# Patient Record
Sex: FEMALE | Race: WHITE | NOT HISPANIC OR LATINO | Employment: FULL TIME | URBAN - METROPOLITAN AREA
[De-identification: names, ages, dates, MRNs, and addresses within clinical notes are randomized per-mention and may not be internally consistent; named-entity substitution may affect disease eponyms.]

---

## 2023-07-18 ENCOUNTER — OFFICE VISIT (OUTPATIENT)
Dept: ORTHOPEDIC SURGERY | Facility: CLINIC | Age: 53
End: 2023-07-18
Payer: COMMERCIAL

## 2023-07-18 VITALS
SYSTOLIC BLOOD PRESSURE: 118 MMHG | HEIGHT: 65 IN | DIASTOLIC BLOOD PRESSURE: 60 MMHG | WEIGHT: 148.2 LBS | BODY MASS INDEX: 24.69 KG/M2

## 2023-07-18 DIAGNOSIS — M25.522 LEFT ELBOW PAIN: Primary | ICD-10-CM

## 2023-07-18 DIAGNOSIS — M77.12 LATERAL EPICONDYLITIS OF LEFT ELBOW: ICD-10-CM

## 2023-07-18 PROCEDURE — 99203 OFFICE O/P NEW LOW 30 MIN: CPT

## 2023-07-18 RX ORDER — MONTELUKAST SODIUM 10 MG/1
10 TABLET ORAL DAILY
COMMUNITY
Start: 2023-05-03

## 2023-07-18 RX ORDER — HYDROXYZINE 50 MG/1
TABLET, FILM COATED ORAL
COMMUNITY

## 2023-07-18 RX ORDER — FAMOTIDINE 40 MG/1
40 TABLET, FILM COATED ORAL DAILY
COMMUNITY
Start: 2023-05-03

## 2023-07-18 RX ORDER — GALCANEZUMAB 120 MG/ML
120 INJECTION, SOLUTION SUBCUTANEOUS
COMMUNITY
Start: 2023-07-07

## 2023-07-18 RX ORDER — RIZATRIPTAN BENZOATE 10 MG/1
TABLET ORAL
COMMUNITY
Start: 2023-04-04

## 2023-07-18 RX ORDER — MELOXICAM 15 MG/1
TABLET ORAL
Qty: 30 TABLET | Refills: 1 | Status: SHIPPED | OUTPATIENT
Start: 2023-07-18

## 2023-07-18 NOTE — PROGRESS NOTES
[facility] Office Visit - Tameka Quan PA-C    Office Visit       Patient Name: Supriya Patel    Chief Complaint:   Chief Complaint   Patient presents with    Left Elbow - Pain       Referring Physician: No ref. provider found    History of Present Illness:   Supriya Patel is a 52 y.o. female who presents with left elbow pain.  Ongoing for the last 3 months.  No injury or trauma. Rates the pain 6/10.  Described as aching and throbbing.  Associated with swelling.  She says the pain has worsened recently due to helping care for her mother in law more frequently this summer.  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  No treatments tried.      Subjective     Review of Systems   Constitutional: Negative.  Negative for chills, fatigue and fever.   HENT: Negative.  Negative for congestion and dental problem.    Eyes: Negative.  Negative for blurred vision.   Respiratory: Negative.  Negative for shortness of breath.    Cardiovascular: Negative.  Negative for leg swelling.   Gastrointestinal: Negative.  Negative for abdominal pain.   Endocrine: Negative.  Negative for polyuria.   Genitourinary: Negative.  Negative for difficulty urinating.   Musculoskeletal:  Positive for arthralgias.   Skin: Negative.    Allergic/Immunologic: Negative.    Neurological: Negative.    Hematological: Negative.  Negative for adenopathy.   Psychiatric/Behavioral: Negative.  Negative for behavioral problems.       Past Medical History: History reviewed. No pertinent past medical history.    Past Surgical History:   Past Surgical History:   Procedure Laterality Date    ANKLE OPEN REDUCTION INTERNAL FIXATION  2018    HAND SURGERY  2015       Family History:   Family History   Problem Relation Age of Onset    Osteoporosis Mother        Social History:   Social History     Socioeconomic History    Marital status:    Tobacco Use    Smoking status: Never   Vaping Use  "   Vaping Use: Never used   Substance and Sexual Activity    Alcohol use: Not Currently    Drug use: Never    Sexual activity: Yes     Partners: Male       Medications:   Current Outpatient Medications:     Emgality 120 MG/ML auto-injector pen, Inject 1 mL under the skin into the appropriate area as directed., Disp: , Rfl:     famotidine (PEPCID) 40 MG tablet, Take 1 tablet by mouth Daily., Disp: , Rfl:     hydrOXYzine (ATARAX) 50 MG tablet, , Disp: , Rfl:     montelukast (SINGULAIR) 10 MG tablet, Take 1 tablet by mouth Daily., Disp: , Rfl:     rizatriptan (MAXALT) 10 MG tablet, TAKE 1 TABLET BY MOUTH FOR HEADACHE, Disp: , Rfl:     Allergies: No Known Allergies    I have reviewed and updated the following portions of the patient's history and review of systems: allergies, current medications, past family history, past medical history, past social history, past surgical history and problem list.    Objective      Vital Signs:   Vitals:    07/18/23 1018   BP: 118/60   Weight: 67.2 kg (148 lb 3.2 oz)   Height: 164.5 cm (64.76\")       Ortho Exam:  General: no acute distress, comfortable  Vitals reviewed in chart    Musculoskeletal Exam:    SIDE: Left elbow pain    Tenderness: Lateral epicondyle    Range of motion measurements (degrees): 0-150  Painful arc of motion: No  Neurovascularly intact  No evidence of septic joint      Results Review:   Imaging: elbow x-rays 2 views - AP and lateral elbow x-ray views     Side: LEFT ELBOW     Indication for elbow x-ray 2 views: elbow pain     Comparison: no comparison views available     Findings: no acute bony finding noted, no obvious soft tissue edema     I personally reviewed the above x-rays    Assessment / Plan      Assessment:  Diagnoses and all orders for this visit:    1. Left elbow pain (Primary)  -     XR ELBOW 2 VIEW LEFT    2. Lateral epicondylitis of left elbow      Elbow pain - lateral epicondylitis     X-ray films reviewed.  No acute bony findings.    Selective " rest/activity modifications recommended while the elbow recovers.    Counseling - I counseled the patient on the diagnosis and treatment plan.  All questions were answered.    Recommended conservative measures    Activity modifications - avoiding painful activities    Wrist immobilizer - discussed wrist immobilizer to limit wrist extension and to help with activity modifications; a wrist brace is indicated in the circumstance as it prevents engagement of the common extensor tendon origin and diminishes pain of the lateral epicondyle.  Wrist immobilizer is often used in the postoperative recovery.  Following lateral epicondylitis surgery and is indicated as well as part of non-surgical measures to hopefully help prevent the need for surgery for lateral epicondylitis.  I counseled the wrist brace would be utilized over the next 4 to 6 weeks taking breaks to prevent stiffness of the wrist but this will serve the purpose to help diminish pain and help with healing/recovery.    NSAIDs -sent prescription for meloxicam to take as needed.  Advised not to take with other anti-inflammatories.    Physical therapy - prescription for formal PT recommended.  She does not have time right now.  She will try home exercises.    Corticosteroid injection - a corticosteroid was discussed as a treatment option at the point of maximal tenderness. We will hold right now. May consider next visit.    We discussed that most patients respond to conservative measures with only a small percentage of patients needing operative intervention after 1 year of conservative measures.  We will discuss an MRI of the elbow if the patient remains refractory to conservative measures.      Follow Up:   2-3 months    History, diagnosis and treatment plan discussed with Dr. Pro.      Tameka Quan PA-C  [facility]       Dictated using Dragon Speech Recognition.

## 2023-08-12 DIAGNOSIS — M77.12 LATERAL EPICONDYLITIS OF LEFT ELBOW: ICD-10-CM

## 2023-08-12 DIAGNOSIS — M25.522 LEFT ELBOW PAIN: ICD-10-CM

## 2023-08-14 RX ORDER — MELOXICAM 15 MG/1
TABLET ORAL
Qty: 30 TABLET | Refills: 1 | Status: SHIPPED | OUTPATIENT
Start: 2023-08-14

## 2023-09-19 ENCOUNTER — OFFICE VISIT (OUTPATIENT)
Age: 53
End: 2023-09-19
Payer: COMMERCIAL

## 2023-09-19 VITALS
HEIGHT: 65 IN | BODY MASS INDEX: 25.01 KG/M2 | DIASTOLIC BLOOD PRESSURE: 64 MMHG | SYSTOLIC BLOOD PRESSURE: 96 MMHG | WEIGHT: 150.1 LBS

## 2023-09-19 DIAGNOSIS — M77.12 LATERAL EPICONDYLITIS OF LEFT ELBOW: Primary | ICD-10-CM

## 2023-09-19 DIAGNOSIS — M25.522 LEFT ELBOW PAIN: ICD-10-CM

## 2023-09-19 RX ORDER — MELOXICAM 15 MG/1
TABLET ORAL
Qty: 30 TABLET | Refills: 1 | Status: SHIPPED | OUTPATIENT
Start: 2023-09-19

## 2023-09-19 RX ORDER — TRIAMCINOLONE ACETONIDE 40 MG/ML
40 INJECTION, SUSPENSION INTRA-ARTICULAR; INTRAMUSCULAR
Status: COMPLETED | OUTPATIENT
Start: 2023-09-19 | End: 2023-09-19

## 2023-09-19 RX ORDER — LIDOCAINE HYDROCHLORIDE 10 MG/ML
3 INJECTION, SOLUTION EPIDURAL; INFILTRATION; INTRACAUDAL; PERINEURAL
Status: COMPLETED | OUTPATIENT
Start: 2023-09-19 | End: 2023-09-19

## 2023-09-19 RX ADMIN — LIDOCAINE HYDROCHLORIDE 3 ML: 10 INJECTION, SOLUTION EPIDURAL; INFILTRATION; INTRACAUDAL; PERINEURAL at 15:26

## 2023-09-19 RX ADMIN — TRIAMCINOLONE ACETONIDE 40 MG: 40 INJECTION, SUSPENSION INTRA-ARTICULAR; INTRAMUSCULAR at 15:26

## 2023-09-19 NOTE — PROGRESS NOTES
Procedure   - Hand/Upper Extremity Injection: L elbow for lateral epicondylitis on 9/19/2023 3:26 PM  Indications: pain  Details: 21 G needle, lateral approach  Medications: 3 mL lidocaine PF 1% 1 %; 40 mg triamcinolone acetonide 40 MG/ML  Outcome: tolerated well, no immediate complications  Procedure, treatment alternatives, risks and benefits explained, specific risks discussed. Consent was given by the patient. Immediately prior to procedure a time out was called to verify the correct patient, procedure, equipment, support staff and site/side marked as required. Patient was prepped and draped in the usual sterile fashion.

## 2023-09-19 NOTE — PROGRESS NOTES
[facility] Office Follow Up       Office Follow Up Visit       Patient Name: Supriya Patel    Chief Complaint:   Chief Complaint   Patient presents with    Follow-up     2 month follow up; Left elbow pain       Referring Physician: No ref. provider found    History of Present Illness:   Supriya Patel returns to clinic today for left elbow pain.  Ongoing for the last 6 months.  Last visit on 7/18/2023.  She reports some improvement in symptoms since that time.  She was given a wrist immobilizer and home exercises for lateral epicondylitis.  She says the elbow continues to cause pain with some motions including pulling up pants or lifting heavy objects.  She is taking meloxicam as needed.      Subjective     Review of Systems   Constitutional: Negative.    HENT: Negative.     Eyes: Negative.    Respiratory: Negative.     Cardiovascular: Negative.    Gastrointestinal: Negative.    Endocrine: Negative.    Genitourinary: Negative.    Musculoskeletal:  Positive for arthralgias.   Skin: Negative.    Allergic/Immunologic: Negative.    Neurological: Negative.    Hematological: Negative.    Psychiatric/Behavioral: Negative.        I have reviewed and updated the following portions of the patient's history and review of systems: allergies, current medications, past family history, past medical history, past social history, past surgical history and problem list.    Medications:   Current Outpatient Medications:     Emgality 120 MG/ML auto-injector pen, Inject 1 mL under the skin into the appropriate area as directed., Disp: , Rfl:     famotidine (PEPCID) 40 MG tablet, Take 1 tablet by mouth Daily., Disp: , Rfl:     hydrOXYzine (ATARAX) 50 MG tablet, , Disp: , Rfl:     meloxicam (MOBIC) 15 MG tablet, TAKE 1 TABLET BY MOUTH DAILY WITH FOOD. STOP IF YOU HAVE UPSET STOMACH/ STOMACH IRRITATION, Disp: 30 tablet, Rfl: 1    montelukast (SINGULAIR) 10 MG  "tablet, Take 1 tablet by mouth Daily., Disp: , Rfl:     rizatriptan (MAXALT) 10 MG tablet, TAKE 1 TABLET BY MOUTH FOR HEADACHE, Disp: , Rfl:     Allergies: No Known Allergies      Objective      Vital Signs:   Vitals:    09/19/23 1514   BP: 96/64   Weight: 68.1 kg (150 lb 1.6 oz)   Height: 165.1 cm (65\")       Ortho Exam:  General: no acute distress, comfortable  Vitals reviewed in chart    Musculoskeletal Exam:    SIDE: Left elbow    Tenderness: Lateral epicondyle    Range of motion measurements (degrees): 0-140  Painful arc of motion: No  No skin changes  No evidence of septic joint      Results Review:  XR ELBOW 2 VIEW LEFT  Imaging: elbow x-rays 2 views - AP and lateral elbow x-ray views    Side: LEFT ELBOW    Indication for elbow x-ray 2 views: elbow pain    Comparison: no comparison views available    Findings: no acute bony finding noted, no obvious soft tissue edema    I personally reviewed the above x-rays       Quality Metrics:   BMI:   BMI is within normal parameters. No other follow-up for BMI required.       Tobacco:   Supriya Patel  reports that she has never smoked. She has never used smokeless tobacco..  She understands risk associated with smoking.    Assessment / Plan      Assessment:   Diagnoses and all orders for this visit:    1. Lateral epicondylitis of left elbow (Primary)  -     meloxicam (MOBIC) 15 MG tablet; TAKE 1 TABLET BY MOUTH DAILY WITH FOOD. STOP IF YOU HAVE UPSET STOMACH/ STOMACH IRRITATION  Dispense: 30 tablet; Refill: 1    2. Left elbow pain  -     meloxicam (MOBIC) 15 MG tablet; TAKE 1 TABLET BY MOUTH DAILY WITH FOOD. STOP IF YOU HAVE UPSET STOMACH/ STOMACH IRRITATION  Dispense: 30 tablet; Refill: 1    Other orders  -     - Hand/Upper Extremity Injection: L elbow          Plan:  Some improvements of left lateral epicondylitis with use of wrist immobilizer and meloxicam.  Persistent symptoms with wrist extension and lifting objects.  We will proceed with corticosteroid injection for " symptoms today.  She understands symptoms often take 6 to 12 months to resolve with conservative treatments. Hopeful injection will improve symptoms and she will not need further injections. We will reassess next visit.  We again discussed possibility of MRI for surgical intervention if symptoms progress.  Refill for meloxicam provided today.    I discussed with the patient the potential benefits of performing a therapeutic injection of the left lateral epicondyle as well as potential risks including but not limited to infection, swelling, pain, bleeding, bruising, nerve/vessel damage, skin color changes, transient elevation in blood glucose levels, and fat atrophy. After informed consent and verifying correct patient, procedure site, and type of procedure, the area was prepped with Hibiclens, ethyl chloride was used to numb the skin. 3 cc of lidocaine and 1mL of 40 mg triamcinolone acetonide 40 MG/ML and was injected into the site. The patient tolerated the procedure well. There were no complications.         Follow Up:   3 months    Tameka Quan PA-C  [facility]    Dictated using Dragon Speech Recognition.

## 2024-01-11 ENCOUNTER — OFFICE VISIT (OUTPATIENT)
Dept: ORTHOPEDIC SURGERY | Facility: CLINIC | Age: 54
End: 2024-01-11
Payer: COMMERCIAL

## 2024-01-11 VITALS
WEIGHT: 146.8 LBS | BODY MASS INDEX: 24.46 KG/M2 | HEIGHT: 65 IN | DIASTOLIC BLOOD PRESSURE: 70 MMHG | SYSTOLIC BLOOD PRESSURE: 118 MMHG

## 2024-01-11 DIAGNOSIS — M77.12 LATERAL EPICONDYLITIS OF LEFT ELBOW: Primary | ICD-10-CM

## 2024-01-11 DIAGNOSIS — M25.522 LEFT ELBOW PAIN: ICD-10-CM

## 2024-01-11 PROCEDURE — 99213 OFFICE O/P EST LOW 20 MIN: CPT

## 2024-01-11 PROCEDURE — 20550 NJX 1 TENDON SHEATH/LIGAMENT: CPT

## 2024-01-11 RX ORDER — DOXYCYCLINE 100 MG/1
1 TABLET ORAL EVERY 12 HOURS SCHEDULED
COMMUNITY
Start: 2024-01-04

## 2024-01-11 RX ADMIN — LIDOCAINE HYDROCHLORIDE 3 ML: 10 INJECTION, SOLUTION EPIDURAL; INFILTRATION; INTRACAUDAL; PERINEURAL at 15:40

## 2024-01-11 RX ADMIN — TRIAMCINOLONE ACETONIDE 40 MG: 40 INJECTION, SUSPENSION INTRA-ARTICULAR; INTRAMUSCULAR at 15:40

## 2024-01-11 NOTE — PROGRESS NOTES
[facility] Office Follow Up       Office Follow Up Visit       Patient Name: Supriya Patel    Chief Complaint:   Chief Complaint   Patient presents with    Follow-up     4 month follow-up: Lateral epicondylitis of left elbow       Referring Physician: No ref. provider found    History of Present Illness:   Supriya Patel returns to clinic today for Follow-up of left elbow pain.  Ongoing for the last 6 months.  Last visit on 9/19/2023.  Corticosteroid injection for left lateral epicondylitis at that time.  Responded well.  She states the injection lasted for approximately 2 to 3 months.  Pain has now returned.      Subjective     Review of Systems   Constitutional: Negative.  Negative for chills, fatigue and fever.   HENT: Negative.  Negative for congestion and dental problem.    Eyes: Negative.  Negative for blurred vision.   Respiratory: Negative.  Negative for shortness of breath.    Cardiovascular: Negative.  Negative for leg swelling.   Gastrointestinal: Negative.  Negative for abdominal pain.   Endocrine: Negative.  Negative for polyuria.   Genitourinary: Negative.  Negative for difficulty urinating.   Musculoskeletal:  Positive for arthralgias.   Skin: Negative.    Allergic/Immunologic: Negative.    Neurological: Negative.    Hematological: Negative.  Negative for adenopathy.   Psychiatric/Behavioral: Negative.  Negative for behavioral problems.         I have reviewed and updated the following portions of the patient's history and review of systems: allergies, current medications, past family history, past medical history, past social history, past surgical history and problem list.    Medications:   Current Outpatient Medications:     doxycycline (ADOXA) 100 MG tablet, Take 1 tablet by mouth Every 12 (Twelve) Hours., Disp: , Rfl:     Emgality 120 MG/ML auto-injector pen, Inject 1 mL under the skin into the appropriate area as  "directed., Disp: , Rfl:     hydrOXYzine (ATARAX) 50 MG tablet, , Disp: , Rfl:     meloxicam (MOBIC) 15 MG tablet, TAKE 1 TABLET BY MOUTH DAILY WITH FOOD. STOP IF YOU HAVE UPSET STOMACH/ STOMACH IRRITATION, Disp: 30 tablet, Rfl: 1    rizatriptan (MAXALT) 10 MG tablet, TAKE 1 TABLET BY MOUTH FOR HEADACHE, Disp: , Rfl:     Allergies: No Known Allergies      Objective      Vital Signs:   Vitals:    01/11/24 1527   BP: 118/70   Weight: 66.6 kg (146 lb 12.8 oz)   Height: 165.1 cm (65\")       Ortho Exam:  General: no acute distress, comfortable  Vitals reviewed in chart    Musculoskeletal Exam:    SIDE: Left elbow    Tenderness: Lateral epicondyle    Range of motion measurements (degrees): Full range of motion left elbow/hand/wrist  No skin changes  Painful arc of motion: Wrist extension painful  No evidence of septic joint      Results Review:  XR ELBOW 2 VIEW LEFT  Imaging: elbow x-rays 2 views - AP and lateral elbow x-ray views    Side: LEFT ELBOW    Indication for elbow x-ray 2 views: elbow pain    Comparison: no comparison views available    Findings: no acute bony finding noted, no obvious soft tissue edema    I personally reviewed the above x-rays       I have noted results reviewed from previous encounter.        Assessment / Plan      Assessment:   Diagnoses and all orders for this visit:    1. Lateral epicondylitis of left elbow (Primary)  -     - Medium Joint Arthrocentesis: L elbow    2. Left elbow pain  -     - Medium Joint Arthrocentesis: L elbow        Quality Metrics:   BMI:   BMI is within normal parameters. No other follow-up for BMI required.       Tobacco:   Supriya Patel  reports that she has never smoked. She has never been exposed to tobacco smoke. She has never used smokeless tobacco..        Plan:  She has had another flareup of left lateral epicondylitis.  Counseled regarding nature of lateral epicondylitis.  Often symptoms resolve within 12 months of nonoperative treatment.  We will proceed with " repeat corticosteroid injection for left lateral epicondylitis today.  Encouraged to continue with left wrist immobilizer use to limit wrist extension.  Encouraged to take over-the-counter anti-inflammatories as needed.  An MRI of the left elbow may be useful in the future if symptoms persist.      Follow Up:   2-3 months if symptoms persist    Tameka Davila PA-C  [facility]    Dictated using Dragon Speech Recognition.

## 2024-01-11 NOTE — PROGRESS NOTES
Procedure   - Medium Joint Arthrocentesis: L elbow on 1/11/2024 3:40 PM  Indications: pain  Details: 21 G needle, lateral approach  Medications: 40 mg triamcinolone acetonide 40 MG/ML; 3 mL lidocaine PF 1% 1 %  Outcome: tolerated well, no immediate complications  Procedure, treatment alternatives, risks and benefits explained, specific risks discussed. Consent was given by the patient. Immediately prior to procedure a time out was called to verify the correct patient, procedure, equipment, support staff and site/side marked as required. Patient was prepped and draped in the usual sterile fashion.

## 2024-01-16 RX ORDER — TRIAMCINOLONE ACETONIDE 40 MG/ML
40 INJECTION, SUSPENSION INTRA-ARTICULAR; INTRAMUSCULAR
Status: COMPLETED | OUTPATIENT
Start: 2024-01-11 | End: 2024-01-11

## 2024-01-16 RX ORDER — LIDOCAINE HYDROCHLORIDE 10 MG/ML
3 INJECTION, SOLUTION EPIDURAL; INFILTRATION; INTRACAUDAL; PERINEURAL
Status: COMPLETED | OUTPATIENT
Start: 2024-01-11 | End: 2024-01-11

## 2024-04-10 ENCOUNTER — TELEPHONE (OUTPATIENT)
Dept: ORTHOPEDIC SURGERY | Facility: CLINIC | Age: 54
End: 2024-04-10
Payer: COMMERCIAL

## 2024-04-10 NOTE — TELEPHONE ENCOUNTER
----- Message from Supriya Patel sent at 4/9/2024 10:15 AM EDT -----  Regarding: Appointment Cancellation Request  Contact: [phone]  Supriya Patel would like to cancel the following appointments:    Tameka Davila in [facility] ([phone]), 4/11/2024  3:20 PM    Comments:  I am unable to get off work. I can reschedule for a late afternoon appointment (as late as possible).  I am unavailable April 22-24 and May 6, 16.  May 28 would be possible if available.     My arm is no better and I did not see any change after the last shot so I’m not sure if there really is a reasonable next step.     Thank you!

## 2024-04-10 NOTE — TELEPHONE ENCOUNTER
HUB OK TO RELAY    Called pt and lvm to call  back to reschedule. Also offered to send a TE if she wanted.